# Patient Record
Sex: FEMALE | Race: OTHER | HISPANIC OR LATINO | URBAN - METROPOLITAN AREA
[De-identification: names, ages, dates, MRNs, and addresses within clinical notes are randomized per-mention and may not be internally consistent; named-entity substitution may affect disease eponyms.]

---
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
## 2025-01-18 NOTE — PROCEDURE: ADDITIONAL NOTES
No
Additional Notes: Discussed accutane vs antibiotics vs topicals. Patient opted for accutane. Patient registered in [employer], paperwork filled out. Patient will have labs done during 30 day follow up if patient decides to move forward with accutane tx.\\n*\\n*\\nPatients 2 forms of contraception : oral birth control, make latex condoms.
Detail Level: Simple